# Patient Record
Sex: FEMALE | Race: WHITE
[De-identification: names, ages, dates, MRNs, and addresses within clinical notes are randomized per-mention and may not be internally consistent; named-entity substitution may affect disease eponyms.]

---

## 2017-09-21 ENCOUNTER — HOSPITAL ENCOUNTER (INPATIENT)
Dept: HOSPITAL 80 - FLD | Age: 37
LOS: 3 days | Discharge: HOME | End: 2017-09-24
Attending: ADVANCED PRACTICE MIDWIFE | Admitting: ADVANCED PRACTICE MIDWIFE
Payer: COMMERCIAL

## 2017-09-21 DIAGNOSIS — Z3A.39: ICD-10-CM

## 2017-09-21 LAB
% IMMATURE GRANULYOCYTES: 2.3 % (ref 0–1.1)
ABSOLUTE IMMATURE GRANULOCYTES: 0.27 10^3/UL (ref 0–0.1)
ABSOLUTE NRBC COUNT: 0 10^3/UL (ref 0–0.01)
ADD DIFF?: NO
ADD MORPH?: NO
ADD SCAN?: NO
ATYPICAL LYMPHOCYTE FLAG: 0 (ref 0–99)
ERYTHROCYTE [DISTWIDTH] IN BLOOD BY AUTOMATED COUNT: 13.5 % (ref 11.5–15.2)
FRAGMENT RBC FLAG: 10 (ref 0–99)
HCT VFR BLD CALC: 34.9 % (ref 38–47)
HGB BLD-MCNC: 12.1 G/DL (ref 12.6–16.3)
LEFT SHIFT FLG: 30 (ref 0–99)
LIPEMIA HEMOLYSIS FLAG: 90 (ref 0–99)
MCH RBC BLDCO QN: 32.9 PG (ref 27.9–34.1)
MCHC RBC AUTO-ENTMCNC: 34.7 G/DL (ref 32.4–36.7)
MCV RBC AUTO: 94.8 FL (ref 81.5–99.8)
NRBC-AUTO%: 0 % (ref 0–0.2)
PLATELET # BLD: 146 10^3/UL (ref 150–400)
PLATELET CLUMPS FLAG: 0 (ref 0–99)
PMV BLD AUTO: 11.7 FL (ref 8.7–11.7)
RBC # BLD AUTO: 3.68 10^6/UL (ref 4.18–5.33)

## 2017-09-21 RX ADMIN — MISOPROSTOL SCH MCG: 100 TABLET ORAL at 19:51

## 2017-09-21 RX ADMIN — AMPICILLIN SODIUM SCH MLS: 1 INJECTION, POWDER, FOR SOLUTION INTRAMUSCULAR; INTRAVENOUS at 21:11

## 2017-09-21 NOTE — GHP
[f 
rep st]



                                                            HISTORY AND PHYSICAL





DATE OF ADMISSION:  2017



HISTORY OF PRESENT ILLNESS:  37-year-old  at 39 weeks 5 days by LMP 
consistent with 6-week ultrasound who presents to Labor and Delivery due to 
gross rupture of membranes.  Patient reports having a large gush of fluid at 
1500.  She reports the fluid to be clear and odorless.  She denies any regular 
contractions or vaginal bleeding.  She reports positive fetal movement.



PAST MEDICAL HISTORY:  +achromatopsia.



PAST SURGICAL HISTORY:  She denies any surgical history.



SOCIAL HISTORY:  She is .  Denies any tobacco use (she was a smoker, 
quit more than 10 years ago).  Denies any alcohol or drug use.  Denies any 
history of STDs.



GYNECOLOGICAL HISTORY:  She denies any history of abnormal Pap smears or 
procedures on her cervix.  Denies any history of STDs.



OBSTETRICAL HISTORY:  

1) SAB x1 in 2016

2) current pregnancy: she entered care with [facility] at 7 weeks.  
She has received routine prenatal care.  Her pregnancy is complicated by 
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  



PNLs: O+, ABS neg, HIV NR, RPR NR, Hep B NR, pap NIL, HPV neg, gc/ct neg



MEDICATIONS:  Prenatal vitamin with DHA.



ALLERGIES:  NKDA



REVIEW OF SYSTEMS:  10-point review of systems was reviewed and all negative 
except pertinent information which was disclosed in the HPI.  

GENERAL:  She denies any generalized fatigue.  Reports feeling overall well.  

HEENT:  Denies any headache, visual changes, or sore throat.  

CARDIOVASCULAR:  Denies any chest pain or palpitations. 

PULMONARY:  Denies any cough or shortness of breath.  

GI:  Denies any nausea, vomiting, diarrhea, or constipation.  

GENITOURINARY:  Denies any dysuria, vaginal bleeding, vaginal discharge.  

MUSCULOSKELETAL:  Denies any edema in the extremities.  

SKIN:  Denies any rashes or lesions.  

NEUROLOGIC:  Denies any numbness, tingling, loss of consciousness.  

PSYCH:  Denies any depression or anxiety.



PHYSICAL EXAMINATION:  

GENERAL APPEARANCE:  Alert and oriented x3.  

HEENT:  Normocephalic and atraumatic.  

NECK:  Supple.  

HEART:  Regular rate and rhythm.  No murmurs noted.  

LUNGS:  Clear to auscultate bilaterally.  No wheezes or rhonchi noted.  

ABDOMEN:  Gravid, soft, nontender.  

:  Pelvic exam was performed in the office, SVE /-3  

EXTREMITIES:  Negative for edema.  Negative Homans sign.  

NEUROLOGIC:  Grossly normal.  



FETAL ASSESSMENT:  Fetal heart tones are in the 140s.  Category 1 fetal heart 
rate tracing.  EFW approximately 3300 g.  



TOCO:  Irregular contractions noted.  Patient reports contractions q.4 to 6 
minutes.  Contractions are moderate to palpation.



ASSESSMENT:  

1.  37-year-old  2, para 0010 at 39 and 5 by last menstrual period 
consistent with 6-week ultrasound.  

2.  SROM at 1500, clear fluid.  

3.  GBS+  

4.  Category 1 fetal heart rate tracing.



PLAN:  

1.  Admit to Labor and Delivery.  

2.  IV access/IV antibiotics secondary to positive GBS.  

3.  Expectant management.  At this time, will consider augmentation with 
Cytotec p.o. if indicated.  

4.  Reassess p.r.n.  

5. Anticipate .





Job #:  [id number]

MTDD

## 2017-09-22 RX ADMIN — IBUPROFEN PRN MG: 600 TABLET ORAL at 13:38

## 2017-09-22 RX ADMIN — IBUPROFEN PRN MG: 600 TABLET ORAL at 07:33

## 2017-09-22 RX ADMIN — AMPICILLIN SODIUM SCH MLS: 1 INJECTION, POWDER, FOR SOLUTION INTRAMUSCULAR; INTRAVENOUS at 04:53

## 2017-09-22 RX ADMIN — AMPICILLIN SODIUM SCH MLS: 1 INJECTION, POWDER, FOR SOLUTION INTRAMUSCULAR; INTRAVENOUS at 00:23

## 2017-09-22 RX ADMIN — MISOPROSTOL SCH: 100 TABLET ORAL at 19:39

## 2017-09-22 RX ADMIN — MISOPROSTOL SCH MCG: 100 TABLET ORAL at 01:01

## 2017-09-22 RX ADMIN — MISOPROSTOL SCH: 100 TABLET ORAL at 19:38

## 2017-09-22 RX ADMIN — DOCUSATE SODIUM PRN MG: 100 CAPSULE, LIQUID FILLED ORAL at 13:39

## 2017-09-22 RX ADMIN — IBUPROFEN PRN MG: 600 TABLET ORAL at 19:39

## 2017-09-22 NOTE — OBPROG
Labor Progress Note


Assessment/Plan: 


Assessment:











[id number] with IUP@ 39-5


PROM


GBS+


cat 1 FHR tracing


active labor














Plan:


labor down


reassess 1-2hr/PRN 


anticipate 





17 05:35





Subjective/Intrapartum Course: 





17 07:00


Late entry


pt doing well, breathing through contractions. 


reports feeling pressure with each contraction


17 07:01





Objective: 





 





 17 17:00 





 











Patient ABO/Rh  O POSITIVE   17  17:00    








 











Temp Pulse Resp BP Pulse Ox


 


    70   18   101/75    


 


    17 00:30  17 00:30  17 00:30   








VSS


Exam: 


FHR Cat 1 FHR tracing at this time, does have periods of min variability





- SVE


Dilation (cm): 9


Effacement (%): 90


Station: 0


Membranes: SROM


Amniotic Fluid Color: Clear


Dilation Complete Date: 17


Dilation Complete Time: 04:57





- Contraction Pattern Assessment


Current Contraction Pattern: Regular





- AP


Antepartum Course: 





17 07:03


PNC: uncomplicated, +GBS





- Physical Exam


General Appearance: WD/WN


Neck: non-tender


Respiratory: chest non-tender


Skin: normal color


Neuro/Psych: no motor/sensory deficits, alert, oriented x 3





Oxytocin Orders Assessment





- Pre-Induction/Augmentation Assessment


Fetal Presentation: Vertex


Gestational Age: 39 week(s) and 5 day(s)





ICD10 Worksheet


Patient Problems: 


 Problems











Problem Status Onset


 


PROM (premature rupture of membranes) Acute  


 


Positive GBS test Acute  














- ICD10 Problem Qualifiers


(1) PROM (premature rupture of membranes)








(2) Positive GBS test

## 2017-09-22 NOTE — POSTANESTH
Post Anesthetic Evaluation


Cardiovascular Status: Tx Hyper/Hypo-tension (Stable after phenylephrine.)


Respiratory Status: Normal, Stable, Similar to Pre-op Cond.


Level of Consciousness/Mental Status: Can Participate in Eval, Alert and 

Oriented


Pain Control: Adequate, Prn Tx Ordered


Nausea/Vomiting Control: Adequate, Prn Tx Ordered


Complications Possibly Related to Anesthesia: None Noted

## 2017-09-22 NOTE — PREANESOB
Obstetric Pre-Anesthesia Info





- General Info


Proposed Procedure: Labor and delivery.


: 2


Para: 0


WBD: 40





- Fetal Info


Fetal Status: Full Term


Fetal Monitors: External


FHR Baseline (bpm): 130


FHR Pattern: Reassuring





- Labor Status


Cervical Dilation per last OB SVE: 3


Indications for Labor Analgesia: Pain Control


Labor Epidural: Proposed





Anesthesia


ROS: 


Negative.


Allergies/Adverse Reactions: 


 











Allergy/AdvReac Type Severity Reaction Status Date / Time


 


No Known Allergies Allergy   Unverified 17 15:57











Home Medications: 














 Medication  Instructions  Recorded


 


Calcium Carb/Magnesium Ox,Carb 1 each PO 17





[Ghulam-Mag Tablet Chewable]  


 


Iron/Vitamin B Comp W-C [Blood 1 tab PO DAILY 17





Builder Vit w/ Iron]  


 


Prenatal Vit27&Calcium/Iron/FA 1 each PO DAILY 17





[Prenatal Rx 1 Tablet (RX)]  











Visit Medications: 





 











Generic Name Dose Route Start Last Admin





  Trade Name Freq  PRN Reason Stop Dose Admin


 


Ampicillin Sodium 1 gm/ Sodium  100 mls @ 200 mls/hr  17 20:22  17 

00:23





  Chloride  IV  10/21/17 20:21  100 mls





  Q4H JACINDA   Administration





  Protocol   


 


Lactated Ringer's  1,000 mls @ 0 mls/hr  17 16:21  17 17:07





  Lr  IV  18 16:20  1,000 mls





  PRN PRN   Administration





  SEE PROTOCOL CONDITIONS   





  Protocol   





  Per Protocol   


 


Oxytocin 20 unit/ Lactated  1,002 mls @ 150 mls/hr  17 21:00  





  Ringer's  IV   





  PRN PRN   





  Post-partum bleeding   


 


Ibuprofen  600 mg  17 16:21  





  Motrin  PO  18 16:20  





  Q6HRS PRN   





  post partum, inflammation   


 


Magnesium Sulfate  454 gm  17 16:21  





  Epsom Salt  TP  18 16:20  





  Q1H PRN   





  perineal discomfort    


 


Misoprostol  50 mcg  17 22:00  17 19:51





  Cytotec  PO  18 21:59  50 mcg





  Q4 JACINDA   Administration


 


Olive Oil  118 ml  17 16:21  





  Sweet Oil  MISC  18 16:20  





  ONCE PRN   





  perineal massage   


 


Terbutaline Sulfate  0.25 mg  17 16:21  





  Brethine  IV  18 16:20  





  ONCE PRN   





  Tachysystole   














Discontinued Medications














Generic Name Dose Route Start Last Admin





  Trade Name Freq  PRN Reason Stop Dose Admin


 


Ammonia (Aromatic Spirit)  Confirm  17 18:10  





  Ammonia Aromatic  Administered  17 18:11  





  Dose   





  1 each   





  IH   





  .STK-MED ONE   


 


Bupivacaine HCl  Confirm  17 22:23  





  Sensorcaine 0.25% Sdv  Administered  17 22:24  





  Dose   





  30 ml   





  .ROUTE   





  .STK-MED ONE   


 


Fentanyl  Confirm  17 22:23  





  Sublimaze  Administered  17 22:24  





  Dose   





  100 mcg   





  .ROUTE   





  .STK-MED ONE   


 


Fentanyl/Bupivacaine HCl  Confirm  17 22:23  





  Fentanyl/Bupivacaine/Ns 2 Mcg/Ml 0.1% (Premix  Administered  17 22:24  





  Dose   





  100 ml   





  EP   





  .STK-MED ONE   


 


Ampicillin Sodium 2 gm/ Sodium  110 mls @ 220 mls/hr  17 16:21  17 

17:07





  Chloride  IV  17 16:50  110 mls





  ONCE ONE   Administration





  Protocol   


 


Oxytocin/Lactated Ringer's  1,000 mls @ 150 mls/hr  17 16:21  





  Pitocin 20 Units/Lr (Premix)  IV   





  PRN PRN   





  Post-partum bleeding   


 


Lidocaine HCl  Confirm  17 18:10  





  Lidocaine Hcl 1%  Administered  17 18:11  





  Dose   





  300 mg   





  .ROUTE   





  .STK-MED ONE   


 


Misoprostol  Confirm  17 18:11  





  Cytotec  Administered  17 18:12  





  Dose   





  1,000 mcg   





  .ROUTE   





  .STK-MED ONE   


 


Olive Oil  Confirm  17 18:10  





  Sweet Oil  Administered  17 18:11  





  Dose   





  118 ml   





  .ROUTE   





  .STK-MED ONE   


 


Phenylephrine HCl  Confirm  17 22:23  





  Neosynephrine  Administered  17 22:24  





  Dose   





  1,000 mcg   





  .ROUTE   





  .STK-MED ONE   














- Anesthesia History


Response to Local Anesthetics: Normal


Anesthesia & Operative History: No Prior Problems


Family Anesthesia History: Negative





- Social History


Substance Use/Abuse: Denies





- Focused Exam


Blood Pressure: 101/75


Heart Rate: 70


Respiratory Rate: 18


Height/Weight (Nursing): 





 











Height                         172.72 cm


 


Weight                         77.564 kg














Physical Exam: 


Within normal limits.


ASA Status: II


Labs: 





 





 17 17:00 





 











Patient ABO/Rh  O POSITIVE   17  17:00    














- Plan


Anesthetic Plan: NIGEL


Consent Signed and on Chart: Yes


Patient/Guardian Understands and Agrees to Plan: Yes


Urgent/Emergent Case: Emilie ibrahim completed preop but documented later for safe 

timely pt care

## 2017-09-22 NOTE — OBDEL
Birth Info


Birth Type: Vaginal


Presentation at Delivery: Vertex


L&D Analgesia/Anesthesia Type: Epidural


GBS+: Yes


Antibiotic Used for + GBS: Ampicillin


Intrapartum Medications: 





 











Generic Name Dose Route Start Last Admin





  Trade Name Freq  PRN Reason Stop Dose Admin


 


Ampicillin Sodium 1 gm/ Sodium  100 mls @ 200 mls/hr  17 20:22  17 

04:53





  Chloride  IV  10/21/17 20:21  100 mls





  Q4H JACINDA   Administration





  Protocol   


 


Lactated Ringer's  1,000 mls @ 0 mls/hr  17 16:21  17 17:07





  Lr  IV  18 16:20  1,000 mls





  PRN PRN   Administration





  SEE PROTOCOL CONDITIONS   





  Protocol   





  Per Protocol   


 


Misoprostol  50 mcg  17 22:00  17 01:01





  Cytotec  PO  18 21:59  50 mcg





  Q4 JACINDA   Administration














Discontinued Medications














Generic Name Dose Route Start Last Admin





  Trade Name Freq  PRN Reason Stop Dose Admin


 


Ampicillin Sodium 2 gm/ Sodium  110 mls @ 220 mls/hr  17 16:21  17 

17:07





  Chloride  IV  17 16:50  110 mls





  ONCE ONE   Administration





  Protocol   














- Hospital Course


Intrapartum: 





17 07:00


Late entry


pt doing well, breathing through contractions. 


reports feeling pressure with each contraction


17 07:01





Indications for Delivery: SROM





Vaginal Delivery





- Delivery Provider


Delivery Physician/CNM: Ashley Moore





- Labor and Delivery


Onset of Contractions Date: 17


Onset of Contractions Time: 02:15


Onset of Contractions Type: Augmented


Rupture of Membranes Date: 17


Rupture of Membranes Time: 15:00


Rupture of Membranes Type: Spontaneous


Amniotic Fluid Color: Clear


Dilation Complete Date: 17


Dilation Complete Time: 04:57


Placenta Delivery Date: 17


Placenta Delivery Time: 05:59


Total Hours of Labor: 3


Episiotomy: Midline


Laceration: 2nd Degree


Repair: 3-0


Vaginal Sponge Count Correct: Yes


Vaginal Needle Count Correct: Yes


Vaginal Sweep Performed: Yes


EBL: 400


Delivery Events: Nuchal Cord





- Medications


Labor Augmentation/Induction Methods Used: Misoprostol





[place] Birth Data


  ** Chaney


Delivery Date: 17


Delivery Time: 05:54


ODALIS: 17


Gestational Age: 39 week(s) and 6 day(s)


Sex of Infant: Male


Apgar Score (1 Min): 8


Apgar Score (5 Min): 9





ICD10 Worksheet


Patient Problems: 


 Problems











Problem Status Onset


 


PROM (premature rupture of membranes) Acute  


 


Positive GBS test Acute  














- ICD10 Problem Qualifiers


(1) PROM (premature rupture of membranes)








(2) Positive GBS test

## 2017-09-23 VITALS — TEMPERATURE: 98.2 F

## 2017-09-23 RX ADMIN — DOCUSATE SODIUM PRN MG: 100 CAPSULE, LIQUID FILLED ORAL at 09:39

## 2017-09-23 RX ADMIN — IBUPROFEN PRN MG: 600 TABLET ORAL at 15:45

## 2017-09-23 RX ADMIN — IBUPROFEN PRN MG: 600 TABLET ORAL at 03:30

## 2017-09-23 RX ADMIN — IBUPROFEN PRN MG: 600 TABLET ORAL at 09:40

## 2017-09-23 RX ADMIN — IBUPROFEN PRN MG: 600 TABLET ORAL at 21:37

## 2017-09-23 NOTE — OBPP
PostPartum Progress Note


Assessment/Plan: 


Assessment:








1) s/p  PPD # 1 - pt is stable





2) Anemia - pt is asymptomatic











Plan:





Continue routine pp care


Pt may want to go home later today


Discharge home if baby is discharged


Instructions reviewed with pt


Colnt PNV


Pelvic rest


RTC in 4 and 6 weeks for pp check











17 11:21





Subjective/Postpartum Course: 


Pt seen and examined. Doing well with no complaints. Minimal cramping. Mod 

lochia. Domingo regular diet, voiding and passing flatus. No BM yet. BF well so 

far. Wants to go home today if baby is discharged.


17 11:21





Objective: 





 





 17 17:00 





 











Patient ABO/Rh  O POSITIVE   17  17:00    








 











Temp Pulse Resp BP Pulse Ox


 


 36.6 C   69   16   81/53 L  96 


 


 17 06:00  17 06:00  17 06:00  17 06:00  17 06:00











Uterine Position/Fundal Height: Umbilicus -2


Uterine Tone: Firm





PostPartum Physical Exam





- Physical Exam


Respiratory: lungs clear, normal breath sounds


Cardiac/Chest: regular rate, rhythm


Abdomen: normal bowel sounds, non-tender, soft, flatus (+)


Extremities: non-tender, normal inspection


Skin: normal color, warm/dry


Neuro/Psych: alert, normal mood/affect, oriented x 3

## 2017-09-23 NOTE — OBGCSDC
General Delivery Information





- General Info


: 2


Para: 0


Abortions: 0


Birth Type: Vaginal


L&D Analgesia/Anesthesia Type: Epidural


Admission Date: 17


Labs: 





 











Patient ABO/Rh  O POSITIVE   17  17:00    


 


Hct  34.9 % (38.0-47.0)  L  17  17:00    














- Hospital Course


Antepartum: 





17 07:03


PNC: uncomplicated, +GBS


Intrapartum: 





17 07:00


Late entry


pt doing well, breathing through contractions. 


reports feeling pressure with each contraction


17 07:01





Postpartum: 


Pt seen and examined. Doing well with no complaints. Minimal cramping. Mod 

lochia. Domingo regular diet, voiding and passing flatus. No BM yet. BF well so 

far. Wants to go home today if baby is discharged.


17 11:21








Vaginal Birth





- Delivery Provider


Delivery Physician/CNM: Ashley Moore





- Diagnosis


Labor: Augmented


Rupture of Membranes Type: Spontaneous


Amniotic Fluid Color: Clear


Episiotomy: Midline


Laceration: 2nd Degree


Repair: 3-0


Delivery Events: Nuchal Cord





 Birth





-  Delivery


EBL: 400





[place] Birth Data


  ** Chaney


Delivery Date: 17


Delivery Time: 05:54


ODALIS: 17


Gestational Age: 40 week(s) and 0 day(s)


Sex of Infant: Male


 Weight (gm): 3104 g


Apgar Score (1 Min): 8


Apgar Score (5 Min): 9





Discharge Information





- Discharge Information


Condition: Good


Instruction/Follow Up: Four Weeks, Six Weeks

## 2017-09-24 VITALS
OXYGEN SATURATION: 95 % | DIASTOLIC BLOOD PRESSURE: 76 MMHG | SYSTOLIC BLOOD PRESSURE: 108 MMHG | RESPIRATION RATE: 18 BRPM | HEART RATE: 86 BPM

## 2017-09-24 RX ADMIN — IBUPROFEN PRN MG: 600 TABLET ORAL at 03:59

## 2017-09-24 RX ADMIN — IBUPROFEN PRN MG: 600 TABLET ORAL at 10:29

## 2017-09-24 RX ADMIN — DOCUSATE SODIUM PRN MG: 100 CAPSULE, LIQUID FILLED ORAL at 10:29

## 2017-09-24 NOTE — OBPP
PostPartum Progress Note


Assessment/Plan: 


Assessment:





36 yo  WF PPD#2 doing well




















Plan: DC home today





17 12:35





Subjective/Postpartum Course: 


Pt seen and examined. Doing well with no complaints. Minimal cramping. Mod 

lochia. Domingo regular diet, voiding and passing flatus. No BM yet. BF well so 

far. Wants to go home today if baby is discharged.


17 11:21





17 12:36


Patient is doing well. Denies significant lochia or pain. She is ambulating, 

voiding and passing flatus without pain or difficulty. She desires DC home 

today. 


Objective: 





 





 17 17:00 





 











Patient ABO/Rh  O POSITIVE   17  17:00    








 











Temp Pulse Resp BP Pulse Ox


 


 36.8 C   86   18   108/76   95 


 


 17 08:00  17 08:00  17 08:00  17 08:00  17 08:00

















Exam:


VSS


Gen: AAO x 3


HENT: atraumatic, normocephalic


Heart: RRR, no m/r/g


Lungs: CTAB


Abd: soft, non-tender, fundus is firm and below umbilicus


Extremities: no excessive swelling in BLE


Uterine Position/Fundal Height: Umbilicus -2


Uterine Tone: Firm

## 2017-10-18 ENCOUNTER — HOSPITAL ENCOUNTER (OUTPATIENT)
Dept: HOSPITAL 80 - FLAB | Age: 37
End: 2017-10-18
Attending: ADVANCED PRACTICE MIDWIFE
Payer: COMMERCIAL

## 2017-10-18 PROCEDURE — G0463 HOSPITAL OUTPT CLINIC VISIT: HCPCS

## 2018-02-06 ENCOUNTER — HOSPITAL ENCOUNTER (OUTPATIENT)
Dept: HOSPITAL 80 - FLACT | Age: 38
End: 2018-02-06
Attending: ADVANCED PRACTICE MIDWIFE
Payer: COMMERCIAL

## 2018-02-06 PROCEDURE — G0463 HOSPITAL OUTPT CLINIC VISIT: HCPCS
